# Patient Record
Sex: FEMALE | Race: WHITE
[De-identification: names, ages, dates, MRNs, and addresses within clinical notes are randomized per-mention and may not be internally consistent; named-entity substitution may affect disease eponyms.]

---
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
## 2020-01-18 ENCOUNTER — HOSPITAL ENCOUNTER (EMERGENCY)
Dept: HOSPITAL 92 - ERS | Age: 25
Discharge: HOME | End: 2020-01-18
Payer: COMMERCIAL

## 2020-01-18 DIAGNOSIS — S71.112A: Primary | ICD-10-CM
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
## 2020-01-18 DIAGNOSIS — Z23: ICD-10-CM

## 2020-01-18 PROCEDURE — 99283 EMERGENCY DEPT VISIT LOW MDM: CPT

## 2020-01-18 PROCEDURE — 90471 IMMUNIZATION ADMIN: CPT

## 2020-01-18 PROCEDURE — 90715 TDAP VACCINE 7 YRS/> IM: CPT
